# Patient Record
Sex: MALE | Race: BLACK OR AFRICAN AMERICAN | Employment: FULL TIME | URBAN - METROPOLITAN AREA
[De-identification: names, ages, dates, MRNs, and addresses within clinical notes are randomized per-mention and may not be internally consistent; named-entity substitution may affect disease eponyms.]

---

## 2023-01-19 ENCOUNTER — HOSPITAL ENCOUNTER (EMERGENCY)
Age: 58
Discharge: HOME | End: 2023-01-19
Payer: COMMERCIAL

## 2023-01-19 VITALS
HEIGHT: 72 IN | WEIGHT: 230 LBS | TEMPERATURE: 98.8 F | DIASTOLIC BLOOD PRESSURE: 86 MMHG | HEART RATE: 78 BPM | BODY MASS INDEX: 31.15 KG/M2 | RESPIRATION RATE: 18 BRPM | SYSTOLIC BLOOD PRESSURE: 147 MMHG | OXYGEN SATURATION: 97 %

## 2023-01-19 DIAGNOSIS — U07.1 COVID: Primary | ICD-10-CM

## 2023-01-19 LAB — SARS-COV-2, COV2: NORMAL

## 2023-01-19 PROCEDURE — U0005 INFEC AGEN DETEC AMPLI PROBE: HCPCS

## 2023-01-19 PROCEDURE — 99283 EMERGENCY DEPT VISIT LOW MDM: CPT

## 2023-01-19 RX ORDER — DEXTROMETHORPHAN HYDROBROMIDE AND GUAIFENESIN 20; 400 MG/20ML; MG/20ML
10 SOLUTION ORAL
Qty: 355 ML | Refills: 0 | Status: SHIPPED | OUTPATIENT
Start: 2023-01-19

## 2023-01-19 RX ORDER — AMLODIPINE BESYLATE 10 MG/1
20 TABLET ORAL DAILY
COMMUNITY

## 2023-01-19 RX ORDER — TRIAMTERENE AND HYDROCHLOROTHIAZIDE 37.5; 25 MG/1; MG/1
CAPSULE ORAL DAILY
COMMUNITY

## 2023-01-19 RX ORDER — NIRMATRELVIR AND RITONAVIR 150-100 MG
2 KIT ORAL EVERY 12 HOURS
Qty: 1 BOX | Refills: 0 | Status: SHIPPED | OUTPATIENT
Start: 2023-01-19 | End: 2023-01-24

## 2023-01-19 RX ORDER — PRAVASTATIN SODIUM 20 MG/1
20 TABLET ORAL
COMMUNITY

## 2023-01-19 NOTE — Clinical Note
[address]  [address] [place] [id number]  [phone]    Work/School Note    Date: 1/19/2023    To Whom It May concern:    Urban Ac was seen and treated today in the emergency room by the following provider(s):  Nurse Practitioner: Patricia Leon NP. Urban Ac is excused from work/school on 1/19/2023 through 1/21/2023. He is medically clear to return to work/school on 1/22/2023.          Sincerely,          Dandy Fowler NP

## 2023-01-19 NOTE — ED PROVIDER NOTES
[facility] EMERGENCY DEPARTMENT  EMERGENCY DEPARTMENT HISTORY AND PHYSICAL EXAM      Date: 1/19/2023  Patient Name: Ludy Dasilva  MRN: [id number]  [place]: 1965  Date of evaluation: 1/19/2023  Provider: Desirae Garcia NP   Note Started: 9:46 AM 1/19/23    HISTORY OF PRESENT ILLNESS     Chief Complaint   Patient presents with    Positive For Covid-19       History Provided By: Patient    HPI: Ludy Dasilva, 56 y.o. male presented to the ED complaining of upper respiratory symptoms. Patient states he tested positive for COVID at home earlier this week. He denies fever, chills, cough,or chest pain. Requesting COVID testing. PAST MEDICAL HISTORY   Past Medical History:  Past Medical History:   Diagnosis Date    Diabetes ([address]     Hypertension        Past Surgical History:  Past Surgical History:   Procedure Laterality Date    HX APPENDECTOMY         Family History:  No family history on file. Social History:  Social History     Tobacco Use    Smoking status: Never     Passive exposure: Never    Smokeless tobacco: Never   Substance Use Topics    Alcohol use: Not Currently     Comment: occasional    Drug use: Never       Allergies:  No Known Allergies    PCP: José Luis Peace MD    Current Meds:   Discharge Medication List as of 1/19/2023  9:42 AM        CONTINUE these medications which have NOT CHANGED    Details   amLODIPine (NORVASC) 10 mg tablet Take 20 mg by mouth daily. , Historical Med      triamterene-hydroCHLOROthiazide (DYAZIDE) 37.5-25 mg per capsule Take  by mouth daily. , Historical Med      pravastatin (PRAVACHOL) 20 mg tablet Take 20 mg by mouth nightly., Historical Med             REVIEW OF SYSTEMS   Review of Systems    General: (-) fever. (-) weakness. Neuro: No headache. ENT: (+)nasal congestion. No sore throat. CV: No chest pain. No pedal edema  Pulm: No shortness of breath. No wheezing. (+) Cough.   GI: No vomiting All other systems reviewed are negative except as documented in the HPI        PHYSICAL EXAM     ED Triage Vitals [01/19/23 0916]   ED Encounter Vitals Group      BP (!) 147/86      Pulse (Heart Rate) 78      Resp Rate 18      Temp 98.8 °F (37.1 °C)      Temp src       O2 Sat (%) 97 %      Weight 230 lb      Height 6'      Physical Exam  Exam  General: Well developed, well nourished patient in NAD. Skin/Derm: Skin is hot and dry. No rash noted. Ophth: Bulbar conjunctivae are not erythematous. ENT: Oral mucosa is moist.  No pharyngeal erythema. TMs are clear bilaterally. Clear rhinorrhea  Neck: Neck is supple. CV: No murmur or rub. Tachycardic rate and regular rhythm. Pulm: Clear to auscultation without wheezing, crackles, tachypnea or retractions. GI: Soft and non-tender. No peritoneal signs. Lymphatics: (+) Bilateral cervical lymphadenopathy  Neuro: Awake and alert. Psych: Non-anxious. SCREENINGS               No data recorded        LAB, EKG AND DIAGNOSTIC RESULTS   Labs:  No results found for this or any previous visit (from the past 12 hour(s)). Radiologic Studies:  Non-plain film images such as CT, Ultrasound and MRI are read by the radiologist. Plain radiographic images are visualized and preliminarily interpreted by the ED Provider with the below findings:        Interpretation per the Radiologist below, if available at the time of this note:  No results found. PROCEDURES   Unless otherwise noted below, none. Performed by:  Ltarice Harp NP   Procedures        ED COURSE and DIFFERENTIAL DIAGNOSIS/MDM   Vitals:    Vitals:    01/19/23 0916   BP: (!) 147/86   Pulse: 78   Resp: 18   Temp: 98.8 °F (37.1 °C)   SpO2: 97%   Weight: 104.3 kg (230 lb)   Height: 6' (1.829 m)        Patient was given the following medications:  Medications - No data to display    CONSULTS: (Who and What was discussed)  None     Chronic Conditions: Hypertension  Social Determinants affecting Dx or Tx: None  Counseling: Records Reviewed (source and summary of external notes): Nursing Notes    CC/HPI Summary, DDx, ED Course, and Reassessment: DDx: Rhinovirus (55%) > coronavirus, RSV, influenza, parainfluenza, adenovirus, enterovirus, Mycoplasma, Chlamydia. Patient tested positive at home for COVID. Requesting official test. Patient complaining of mild symptoms. Denies fever, chest pain, palpitations or shortness of breath          Disposition Considerations (Tests not done, Shared Decision Making, Pt Expectation of Test or Treatment.): Covid swab obtained. Prescription for Paxlovid sent to the pharmacy. Recommend the following:   Saline nasal spray and gently blow your nose every 1-2 hrs while awake. Cepacol extra-strength lozenges &/or 1-2 teaspoons of honey orally as needed for a scratchy / sore throat. Afrin nasal spray: one spray in each nostril 4x/day for nasal congestion. Vicks salve: apply to the nasal vestibule as needed for nasal congestion. Adults and children >=11yo: Dayquil Cough, Cold & Flu 2 capsules every 4 hrs and Nyquil 2 capsules at bedtime for a cough, runny nose and muscle aches or Mucinex-DM 1-2 tabs/caps every 12 hrs swallowed whole. Breathe Right nasal strips at night for nasal congestion that interferes with sleep. Aleve 2 orally twice a day for fever / aches / pains. FINAL IMPRESSION     1. COVID          DISPOSITION/PLAN   Discharged    Discharge Note: The patient is stable for discharge home. The signs, symptoms, diagnosis, and discharge instructions have been discussed, understanding conveyed, and agreed upon. The patient is to follow up as recommended or return to ER should their symptoms worsen.       PATIENT REFERRED TO:  Follow-up Information       Follow up With Specialties Details Why Contact Info    Lei Portillo MD Family Medicine  If symptoms worsen [address] [place] [address]  [phone]                DISCHARGE MEDICATIONS:  Discharge Medication List as of 1/19/2023  9:42 AM        START taking these medications    Details   nirmatrelvir-ritonavir (Paxlovid, EUA,) 150-100 mg Take 2 Tablets by mouth every twelve (12) hours for 5 days. , Normal, Disp-1 Box, R-0      dextromethorphan-guaiFENesin (Mucinex Fast-Max DM Max) 5-100 mg/5 mL liqd Take 10 mL by mouth every twelve (12) hours as needed for Cough or Congestion. , Normal, Disp-355 mL, R-0           CONTINUE these medications which have NOT CHANGED    Details   amLODIPine (NORVASC) 10 mg tablet Take 20 mg by mouth daily. , Historical Med      triamterene-hydroCHLOROthiazide (DYAZIDE) 37.5-25 mg per capsule Take  by mouth daily. , Historical Med      pravastatin (PRAVACHOL) 20 mg tablet Take 20 mg by mouth nightly., Historical Med               DISCONTINUED MEDICATIONS:  Discharge Medication List as of 1/19/2023  9:42 AM          I am the Primary Clinician of Record: Panchito Reid NP (electronically signed)    (Please note that parts of this dictation were completed with voice recognition software. Quite often unanticipated grammatical, syntax, homophones, and other interpretive errors are inadvertently transcribed by the computer software. Please disregards these errors.  Please excuse any errors that have escaped final proofreading.)

## 2023-01-19 NOTE — ED TRIAGE NOTES
Pt tested positive for covid yesterday. Reports sinus congestion and drainage. Denies fever. Wife also has sx. Reports work needs an official result.

## 2023-01-19 NOTE — DISCHARGE INSTRUCTIONS
Saline nasal spray and gently blow your nose every 1-2 hrs while awake. Cepacol extra-strength lozenges &/or 1-2 teaspoons of honey orally as needed for a scratchy / sore throat. Afrin nasal spray: one spray in each nostril 4x/day for nasal congestion. Vicks salve: apply to the nasal vestibule as needed for nasal congestion. Adults and children >=13yo: Dayquil Cough, Cold & Flu 2 capsules every 4 hrs and Nyquil 2 capsules at bedtime for a cough, runny nose and muscle aches or Mucinex-DM 1-2 tabs/caps every 12 hrs swallowed whole. Breathe Right nasal strips at night for nasal congestion that interferes with sleep. Aleve 2 orally twice a day for fever / aches / pains.

## 2023-01-20 LAB
SARS-COV-2, XPLCVT: DETECTED
SOURCE, COVRS: ABNORMAL